# Patient Record
(demographics unavailable — no encounter records)

---

## 2017-06-15 NOTE — [facility]
Bilateral digital screening mammograms:

 

Reason for examination: Routine screening.

 

Comparison is made to previous studies dated 5/20/2016 and 10/15/2013.

 

The skin and nipples show no abnormalities. No abnormal axillary lymph 

nodes are seen. The breast parenchyma shows scattered fibroglandular 

density. (Breast density: Category B.) There are no dominant masses, 

suspicious calcifications or architectural distortions.

 

Impression:

 

No evidence of malignancy. Recommend routine screening.

 

BI-RADS Category 1:  Negative.

 

"Our facility is accredited by the [employer] 

Mammography Program."

 

This patient's information has been entered into a reminder system for the

patient to be notified with the results of her examination and a target 

date for the next mammogram.

 

Electronically signed by: Hafsa Sales MD (6/15/2017 10:09 AM)